# Patient Record
Sex: FEMALE | Race: WHITE | URBAN - METROPOLITAN AREA
[De-identification: names, ages, dates, MRNs, and addresses within clinical notes are randomized per-mention and may not be internally consistent; named-entity substitution may affect disease eponyms.]

---

## 2022-12-14 ENCOUNTER — OFFICE VISIT (OUTPATIENT)
Dept: URBAN - METROPOLITAN AREA CLINIC 28 | Facility: CLINIC | Age: 27
End: 2022-12-14
Payer: COMMERCIAL

## 2022-12-14 DIAGNOSIS — H52.13 MYOPIA, BILATERAL: Primary | ICD-10-CM

## 2022-12-14 DIAGNOSIS — C41.9: ICD-10-CM

## 2022-12-14 PROCEDURE — 92002 INTRM OPH EXAM NEW PATIENT: CPT | Performed by: OPTOMETRIST

## 2022-12-14 ASSESSMENT — KERATOMETRY
OS: 44.00
OD: 43.88

## 2022-12-14 ASSESSMENT — INTRAOCULAR PRESSURE
OS: 16
OD: 16

## 2022-12-14 ASSESSMENT — VISUAL ACUITY
OD: 20/25
OS: 20/25

## 2022-12-14 NOTE — IMPRESSION/PLAN
Impression: Myopia, bilateral: H52.13. Plan: Updated glasses Rx given for distance/full time wear. Monitor.

## 2022-12-14 NOTE — IMPRESSION/PLAN
Impression: Malignant neoplasm of bone: C41.9. Plan: Educated on exam findings. 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 Recommend complete dilated eye exam with optos.

## 2023-01-03 ENCOUNTER — OFFICE VISIT (OUTPATIENT)
Dept: URBAN - METROPOLITAN AREA CLINIC 28 | Facility: CLINIC | Age: 28
End: 2023-01-03
Payer: COMMERCIAL

## 2023-01-03 DIAGNOSIS — C41.9 MALIGNANT NEOPLASM OF BONE AND ARTICULAR CARTILAGE, UNSPECIFIED: Primary | ICD-10-CM

## 2023-01-03 PROCEDURE — 92134 CPTRZ OPH DX IMG PST SGM RTA: CPT | Performed by: OPTOMETRIST

## 2023-01-03 PROCEDURE — 92014 COMPRE OPH EXAM EST PT 1/>: CPT | Performed by: OPTOMETRIST

## 2023-01-03 ASSESSMENT — INTRAOCULAR PRESSURE
OS: 16
OD: 15

## 2023-01-03 ASSESSMENT — KERATOMETRY
OS: 43.38
OD: 43.88

## 2023-01-03 NOTE — IMPRESSION/PLAN
Impression: Malignant neoplasm of bone: C41.9. Plan: Educated on exam findings. 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 Normal dilated exam and baseline macula OCT (optos not functioning). Recommend annual complete dilated eye exam with optos.